# Patient Record
(demographics unavailable — no encounter records)

---

## 2025-07-16 NOTE — IMAGING
[de-identified] : Examination of the left thumb, patient has generalized swelling, ecchymosis over the left thumb. Tender when palpating the distal phalanx. Good range of motion about the thumb. Neurovascular intact.  Radiographs of the left thumb done at [facility], there is a linear lucency over the distal phalanx, suggesting an Salter-Mayen II of the distal phalanx.  Repeated radiographs of the thumbs bilaterally were done in office today, there is a tiny buckle fracture over the distal phalanx of the left thumb, growth plates intact.

## 2025-07-16 NOTE — HISTORY OF PRESENT ILLNESS
[de-identified] : 5-year-old male here for an evaluation of injury sustained to the left thumb, this injury happened in July 13, 2025, patient close occurred around the thumb, he was seen at the emergency room on July 14, 2025, he was advised of a fracture to the left thumb, he was put in a aluminum foam splint, and advised to follow-up with orthopedics.

## 2025-07-16 NOTE — DISCUSSION/SUMMARY
[de-identified] : Impression: Tiny buckle fracture over the distal phalanx of the left thumb.  Plan: Patient was advised for immobilization with a aluminum foam splint, patient was instructed how to care for the fracture and the splint. Patient was advised no contact sports  Follow-up: 3 weeks for repeat evaluation and repeat x ray

## 2025-07-25 NOTE — PHYSICAL EXAM
[NI] : Normal [de-identified] : left thumb--subungual resolving hematoma  nontender  FROM   no issues   no pain

## 2025-07-25 NOTE — HISTORY OF PRESENT ILLNESS
[FreeTextEntry1] : 5 yr old young otherwise completely healthy male  seen w mother for right thumb mild trauma  RHD  7/16/25 xray reviewed--nopndisplaced linear fx left thumb distal phalynx  does not involve either coreces  DOI 7/13/25  went to hospital--sent to [id number] [facility] Ed drained small subungual hematoma 7/14  not wearing splint--he accidentally flushed down toilet

## 2025-07-25 NOTE — ASSESSMENT
[FreeTextEntry1] : xrays independently reviewed as well as reciewed w pt's mother  30 mins on encounter  no spliont necessary  no precautions at this point--injury over 10 days old and nontender  f/u as needed